# Patient Record
Sex: MALE | URBAN - NONMETROPOLITAN AREA
[De-identification: names, ages, dates, MRNs, and addresses within clinical notes are randomized per-mention and may not be internally consistent; named-entity substitution may affect disease eponyms.]

---

## 2021-07-28 ENCOUNTER — PROCEDURE VISIT (OUTPATIENT)
Dept: ENT CLINIC | Age: 71
End: 2021-07-28
Payer: OTHER GOVERNMENT

## 2021-07-28 DIAGNOSIS — H91.8X9 ASYMMETRICAL HEARING LOSS: Primary | ICD-10-CM

## 2021-07-28 PROCEDURE — 92557 COMPREHENSIVE HEARING TEST: CPT | Performed by: AUDIOLOGIST

## 2021-07-28 PROCEDURE — 92567 TYMPANOMETRY: CPT | Performed by: AUDIOLOGIST

## 2021-07-28 NOTE — PROGRESS NOTES
History   Tisha Hope is a 66 y.o. male who presented to the clinic this date with complaints of decreased hearing bilaterally. Summary   Tympanometry consistent with normal TM mobility bilaterally. Pure tone testing indicates mild to severe SNHL bilaterally. Word recognition scores were excellent bilaterally. Due to slight left asymmetry, ENT follow up is recommended. Based on degree of hearing loss, binaural hearing aids are recommended. Results   Otoscopy:    Right: Non-Occluding Cerumen   Left: Clear EAC/Normal TM    Audiometry:    Right: Mild to severe sloping SNHL   Left: Mild to severe sloping SNHL         Tympanometry:     Right: Type A   Left: Type A    Plan   Results of today's testing were discussed with Mr. Herrera Jamesonnisha and the following recommendations were made:    1. Follow up with ENT for left asymmetric hearing loss. 2. Follow up with [place] for hearing aid evaluation. 3. Monitor hearing yearly, sooner with changes. 4. Hearing protection as warranted.         Audiogram and Acoustic Immittance

## 2024-03-29 ENCOUNTER — TELEPHONE (OUTPATIENT)
Dept: NEUROLOGY | Facility: CLINIC | Age: 74
End: 2024-03-29

## 2024-03-29 NOTE — TELEPHONE ENCOUNTER
Provider: WALKER WEBB    Caller: JENNIFER OLEA    Relationship to Patient: SELF    Pharmacy: N/A    Phone Number: [phone]    Reason for Call: THIS IS A NEW PATIENT APPOINTMENT, PATIENT IS REQUESTING AN APPOINTMENT REMINDER WITH OFFICE INFORMATION BE MAILED TO HIM.     THANK YOU.